# Patient Record
Sex: MALE | Race: WHITE | NOT HISPANIC OR LATINO | Employment: OTHER | URBAN - METROPOLITAN AREA
[De-identification: names, ages, dates, MRNs, and addresses within clinical notes are randomized per-mention and may not be internally consistent; named-entity substitution may affect disease eponyms.]

---

## 2020-12-16 ENCOUNTER — OFFICE VISIT (OUTPATIENT)
Dept: ORTHOPEDIC SURGERY | Facility: CLINIC | Age: 60
End: 2020-12-16

## 2020-12-16 VITALS — HEIGHT: 72 IN | HEART RATE: 99 BPM | OXYGEN SATURATION: 98 % | WEIGHT: 240 LBS | BODY MASS INDEX: 32.51 KG/M2

## 2020-12-16 DIAGNOSIS — M25.561 RIGHT KNEE PAIN, UNSPECIFIED CHRONICITY: Primary | ICD-10-CM

## 2020-12-16 DIAGNOSIS — S76.111A RUPTURE OF RIGHT QUADRICEPS MUSCLE, INITIAL ENCOUNTER: ICD-10-CM

## 2020-12-16 DIAGNOSIS — I25.5 ISCHEMIC CARDIOMYOPATHY: ICD-10-CM

## 2020-12-16 PROCEDURE — 99204 OFFICE O/P NEW MOD 45 MIN: CPT | Performed by: ORTHOPAEDIC SURGERY

## 2020-12-16 RX ORDER — MULTIPLE VITAMINS W/ MINERALS TAB 9MG-400MCG
1 TAB ORAL DAILY
COMMUNITY

## 2020-12-16 RX ORDER — ASPIRIN 81 MG/1
81 TABLET ORAL DAILY
COMMUNITY

## 2020-12-16 RX ORDER — VALSARTAN 160 MG/1
160 TABLET ORAL DAILY
COMMUNITY

## 2020-12-16 RX ORDER — TRIAMCINOLONE ACETONIDE 55 UG/1
2 SPRAY, METERED NASAL DAILY
COMMUNITY

## 2020-12-16 NOTE — PROGRESS NOTES
[facility] Note    Subjective     Chief Complaint   Patient presents with   • Right Knee - Pain        HPI  Tremayne Frazier is a 60 y.o. male who presents with new problem of: right knee pain.  Onset: mechanical fall. The issue has been ongoing for 1 year(s). Pain is a 4/10 on the pain scale. Pain is described as aching and shooting. Associated symptoms include pain, popping, stiffness and giving way/buckling. The pain is worse with walking, standing, climbing stairs and working; resting and ice improve the pain. Previous treatments have included: bracing, cane/walker, NSAIDS and physical therapy.    I have reviewed the following portions of the patient's history:History of Present Illness and review of systems.  He had a right quadriceps tendon rupture October 2018 with surgery in October 2019.  He had a few months physical therapy and lost insurance.  COVID-19 delayed his recovery.  He has a history of cardiac myopathy with ejection fracture of 35%.  His cardiologist is in [place].  He walks with a brace and a cane.  Dr. Osullivan in [place] referred him here.      Past Medical History:   Diagnosis Date   • Cardiomyopathy (CMS/HCC)    • Diabetes (CMS/HCC)    • Osteoarthritis       Past Surgical History:   Procedure Laterality Date   • EAR TUBES     • HERNIA REPAIR     • KNEE SURGERY Right 2019    Quad repair   • LUMBAR DISCECTOMY      L5   • TONSILLECTOMY AND ADENOIDECTOMY     • VASECTOMY        Family History   Problem Relation Age of Onset   • Diabetes Mother    • Cancer Mother    • Aneurysm Paternal Grandfather      Social History     Socioeconomic History   • Marital status:      Spouse name: Not on file   • Number of children: Not on file   • Years of education: Not on file   • Highest education level: Not on file   Tobacco Use   • Smoking status: Never Smoker   • Smokeless tobacco: Never Used   Substance and Sexual Activity   • Drug use: Never   • Sexual activity: Defer     "  Current Outpatient Medications on File Prior to Visit   Medication Sig Dispense Refill   • AMLODIPINE-ATORVASTATIN PO Take  by mouth.     • aspirin 81 MG EC tablet Take 81 mg by mouth Daily.     • Fish Oil-Cholecalciferol (FISH OIL + D3 PO) Take  by mouth.     • Metoprolol-hydroCHLOROthiazide (METOPROLOL-HCTZ ER PO) Take  by mouth.     • multivitamin with minerals tablet tablet Take 1 tablet by mouth Daily.     • Saw Palmetto, Serenoa repens, ([employer] SAW PALMETTO PO) Take  by mouth.     • Triamcinolone Acetonide (NASACORT) 55 MCG/ACT nasal inhaler 2 sprays into the nostril(s) as directed by provider Daily.     • valsartan (DIOVAN) 160 MG tablet Take 160 mg by mouth Daily.       No current facility-administered medications on file prior to visit.       No Known Allergies     The following portions of the patient's history were reviewed and updated as appropriate: allergies, current medications, past family history, past medical history, past social history, past surgical history and problem list.    Review of Systems     Objective      Physical Exam  Pulse 99   Ht 182.9 cm (72\")   Wt 109 kg (240 lb)   SpO2 98%   BMI 32.55 kg/m²     Body mass index is 32.55 kg/m².    GENERAL APPEARANCE: awake, alert & oriented x 3, in no acute distress and well developed, well nourished  PSYCH: normal mood and affect  LUNGS:  breathing nonlabored, no wheezing  EYES: sclera anicteric, pupils equal  CARDIOVASCULAR: palpable pulses. Capillary refill less than 2 seconds  INTEGUMENTARY: skin intact, no clubbing, cyanosis  NEUROLOGIC:  Normal Sensation and reflexes       Ortho Exam  Right knee lacks full extension of 30 degrees.  Palpable defect of the quadriceps tendon.  Stable varus valgus.  No effusion.  Pulses intact.  Skin intact.  Well-healed surgical incision.    Imaging/Studies  Imaging Results (Last 7 Days)     Procedure Component Value Units Date/Time    XR Knee 4+ View Right [[id number]] Resulted: 12/16/20 1504     Updated: " 12/16/20 1505    Narrative:      Knee X-Ray  Indication: Pain    Upright AP of bilateral knees. Lateral, skiers and Sunrise views of right   knee     Findings: Evidence of previous right knee quadriceps tendon repair with   heterotopic ossification in the distal quadriceps  No fracture  No bony lesion  Normal soft tissues  Normal joint spaces    No prior studies were available for comparison.        MRI with quadriceps tendon defect.  He did not bring his MRI with him for me to review.    Assessment/Plan        ICD-10-CM ICD-9-CM   1. Right knee pain, unspecified chronicity  M25.561 719.46   2. Rupture of right quadriceps muscle, initial encounter  S76.111A 843.8   3. Ischemic cardiomyopathy  I25.5 414.8       Orders Placed This Encounter   Procedures   • XR Knee 4+ View Right      Most concerned about his injection fraction of 35% and cardiac myopathy.  He saw his cardiologist yesterday in [place].  I need those notes.  He did not bring his MRI for me to see.  He will bring that for me to view the images.  He was referred here for possible revision quadriceps tendon surgery which would be difficult even in a healthy patient.  But his health problems make his chance for good result less optimal.  I will see next week with the MRI and clearance from his cardiologist.  No guarantees made for surgery.  He is high risk of cardiac event with surgery.  Medical Decision Making  Management Options : over-the-counter medicine  Data/Risk: radiology tests    Evens Barnett MD  12/16/20  15:22 EST         EMR Dragon/Transcription disclaimer:  Much of this encounter note is an electronic transcription of spoken language to printed text. Electronic transcription of spoken language may permit erroneous, or at times, nonsensical words or phrases to be inadvertently transcribed. Although I have reviewed the note for such errors, some may still exist.